# Patient Record
Sex: FEMALE | Race: WHITE | Employment: OTHER | URBAN - METROPOLITAN AREA
[De-identification: names, ages, dates, MRNs, and addresses within clinical notes are randomized per-mention and may not be internally consistent; named-entity substitution may affect disease eponyms.]

---

## 2018-03-22 ENCOUNTER — HOSPITAL ENCOUNTER (OUTPATIENT)
Dept: PREADMISSION TESTING | Age: 68
Discharge: HOME | End: 2018-03-22
Payer: MEDICARE

## 2018-03-22 LAB
HCT VFR BLD AUTO: 38.6 % (ref 35–45)
HGB BLD-MCNC: 12.7 G/DL (ref 12–16)
POTASSIUM SERPL-SCNC: 4.7 MMOL/L (ref 3.5–5.5)

## 2018-03-22 PROCEDURE — 85018 HEMOGLOBIN: CPT | Performed by: PODIATRIST

## 2018-03-22 PROCEDURE — 36415 COLL VENOUS BLD VENIPUNCTURE: CPT | Performed by: PODIATRIST

## 2018-03-22 PROCEDURE — 84132 ASSAY OF SERUM POTASSIUM: CPT | Performed by: PODIATRIST

## 2018-03-23 LAB
FAX TO INFO,FAXT: NORMAL
FAX TO NUMBER,FAXN: NORMAL

## 2018-04-04 ENCOUNTER — HOSPITAL ENCOUNTER (OUTPATIENT)
Dept: LAB | Age: 68
Discharge: HOME | End: 2018-04-04
Payer: MEDICARE

## 2018-04-04 PROCEDURE — 88304 TISSUE EXAM BY PATHOLOGIST: CPT | Performed by: PODIATRIST

## 2018-04-04 PROCEDURE — 88311 DECALCIFY TISSUE: CPT | Performed by: PODIATRIST

## 2018-04-04 PROCEDURE — 88305 TISSUE EXAM BY PATHOLOGIST: CPT | Performed by: PODIATRIST

## 2018-11-29 ENCOUNTER — HOSPITAL ENCOUNTER (OUTPATIENT)
Dept: PREADMISSION TESTING | Age: 68
Discharge: HOME | End: 2018-11-29
Payer: MEDICARE

## 2018-11-29 DIAGNOSIS — M21.6X2 ACQUIRED CAVOVARUS FOOT DEFORMITY, LEFT: ICD-10-CM

## 2018-11-29 DIAGNOSIS — M65.872 OTHER SYNOVITIS AND TENOSYNOVITIS, LEFT ANKLE AND FOOT: ICD-10-CM

## 2018-11-29 DIAGNOSIS — M20.42 HAMMER TOE OF LEFT FOOT: ICD-10-CM

## 2018-11-29 DIAGNOSIS — L60.0 INGROWING NAIL: ICD-10-CM

## 2018-11-29 DIAGNOSIS — Z01.818 PREOP EXAMINATION: ICD-10-CM

## 2018-11-29 LAB
ATRIAL RATE: 77 BPM
CALCULATED P AXIS, ECG09: 57 DEGREES
CALCULATED R AXIS, ECG10: 42 DEGREES
CALCULATED T AXIS, ECG11: 69 DEGREES
DIAGNOSIS, 93000: NORMAL
P-R INTERVAL, ECG05: 166 MS
Q-T INTERVAL, ECG07: 452 MS
QRS DURATION, ECG06: 140 MS
QTC CALCULATION (BEZET), ECG08: 511 MS
VENTRICULAR RATE, ECG03: 77 BPM

## 2018-11-29 PROCEDURE — 93005 ELECTROCARDIOGRAM TRACING: CPT

## 2019-01-08 ENCOUNTER — HOSPITAL ENCOUNTER (OUTPATIENT)
Dept: LAB | Age: 69
Discharge: HOME | End: 2019-01-08
Attending: INTERNAL MEDICINE
Payer: MEDICARE

## 2019-01-08 ENCOUNTER — HOSPITAL ENCOUNTER (OUTPATIENT)
Dept: PREADMISSION TESTING | Age: 69
Discharge: HOME | End: 2019-01-08
Attending: PODIATRIST
Payer: MEDICARE

## 2019-01-08 DIAGNOSIS — M20.42 HAMMER TOE OF LEFT FOOT: ICD-10-CM

## 2019-01-08 DIAGNOSIS — M21.6X2 ACQUIRED CAVOVARUS FOOT DEFORMITY, LEFT: ICD-10-CM

## 2019-01-08 DIAGNOSIS — E78.5 HYPERLIPEMIA: ICD-10-CM

## 2019-01-08 DIAGNOSIS — Z01.812 BLOOD TESTS PRIOR TO TREATMENT OR PROCEDURE: ICD-10-CM

## 2019-01-08 DIAGNOSIS — M65.872 OTHER SYNOVITIS AND TENOSYNOVITIS, LEFT ANKLE AND FOOT: ICD-10-CM

## 2019-01-08 DIAGNOSIS — E55.9 VITAMIN D DEFICIENCY: ICD-10-CM

## 2019-01-08 DIAGNOSIS — L60.0 INGROWING NAIL: ICD-10-CM

## 2019-01-08 LAB
25(OH)D3 SERPL-MCNC: 18.4 NG/ML (ref 30–100)
ALBUMIN SERPL-MCNC: 3.8 G/DL (ref 3.4–5)
ALBUMIN/GLOB SERPL: 1.2 {RATIO} (ref 0.8–1.7)
ALP SERPL-CCNC: 77 U/L (ref 45–117)
ALT SERPL-CCNC: 18 U/L (ref 13–56)
ANION GAP SERPL CALC-SCNC: 8 MMOL/L (ref 3–18)
AST SERPL-CCNC: 17 U/L (ref 15–37)
BILIRUB SERPL-MCNC: 0.4 MG/DL (ref 0.2–1)
BUN SERPL-MCNC: 13 MG/DL (ref 7–18)
BUN/CREAT SERPL: 17
CALCIUM SERPL-MCNC: 8.3 MG/DL (ref 8.5–10.1)
CHLORIDE SERPL-SCNC: 102 MMOL/L (ref 100–108)
CHOLEST SERPL-MCNC: 226 MG/DL
CO2 SERPL-SCNC: 28 MMOL/L (ref 21–32)
CREAT SERPL-MCNC: 0.78 MG/DL (ref 0.6–1.3)
EST. AVERAGE GLUCOSE BLD GHB EST-MCNC: 114 MG/DL
FAX TO INFO,FAXT: NORMAL
FAX TO NUMBER,FAXN: NORMAL
FERRITIN SERPL-MCNC: 59 NG/ML (ref 8–388)
GLOBULIN SER CALC-MCNC: 3.1 G/DL (ref 2–4)
GLUCOSE SERPL-MCNC: 94 MG/DL (ref 74–99)
HBA1C MFR BLD: 5.6 % (ref 4.2–5.6)
HCT VFR BLD AUTO: 39.1 % (ref 35–45)
HDLC SERPL-MCNC: 105 MG/DL (ref 40–60)
HDLC SERPL: 2.2 {RATIO} (ref 0–5)
LDLC SERPL CALC-MCNC: 109.4 MG/DL (ref 0–100)
LIPID PROFILE,FLP: ABNORMAL
POTASSIUM SERPL-SCNC: 3.9 MMOL/L (ref 3.5–5.5)
POTASSIUM SERPL-SCNC: 3.9 MMOL/L (ref 3.5–5.5)
PROT SERPL-MCNC: 6.9 G/DL (ref 6.4–8.2)
SODIUM SERPL-SCNC: 138 MMOL/L (ref 136–145)
TRIGL SERPL-MCNC: 58 MG/DL (ref ?–150)
VLDLC SERPL CALC-MCNC: 11.6 MG/DL

## 2019-01-08 PROCEDURE — 85014 HEMATOCRIT: CPT

## 2019-01-08 PROCEDURE — 83036 HEMOGLOBIN GLYCOSYLATED A1C: CPT

## 2019-01-08 PROCEDURE — 82728 ASSAY OF FERRITIN: CPT

## 2019-01-08 PROCEDURE — 80061 LIPID PANEL: CPT

## 2019-01-08 PROCEDURE — 82306 VITAMIN D 25 HYDROXY: CPT

## 2019-01-08 PROCEDURE — 36415 COLL VENOUS BLD VENIPUNCTURE: CPT

## 2019-01-08 PROCEDURE — 80053 COMPREHEN METABOLIC PANEL: CPT

## 2019-01-09 LAB
FAX TO INFO,FAXT: NORMAL
FAX TO NUMBER,FAXN: NORMAL

## 2019-01-16 ENCOUNTER — HOSPITAL ENCOUNTER (OUTPATIENT)
Dept: LAB | Age: 69
Discharge: HOME | End: 2019-01-16
Payer: MEDICARE

## 2019-01-16 PROCEDURE — 88305 TISSUE EXAM BY PATHOLOGIST: CPT

## 2019-01-16 PROCEDURE — 88311 DECALCIFY TISSUE: CPT

## 2019-01-16 PROCEDURE — 88304 TISSUE EXAM BY PATHOLOGIST: CPT

## 2019-09-09 ENCOUNTER — HOSPITAL ENCOUNTER (EMERGENCY)
Age: 69
Discharge: HOME | End: 2019-09-09
Attending: EMERGENCY MEDICINE
Payer: MEDICARE

## 2019-09-09 VITALS
SYSTOLIC BLOOD PRESSURE: 167 MMHG | OXYGEN SATURATION: 99 % | BODY MASS INDEX: 26.68 KG/M2 | HEIGHT: 62 IN | DIASTOLIC BLOOD PRESSURE: 58 MMHG | WEIGHT: 145 LBS | TEMPERATURE: 98.9 F | RESPIRATION RATE: 18 BRPM | HEART RATE: 85 BPM
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
## 2019-09-09 DIAGNOSIS — L03.113 CELLULITIS OF RIGHT UPPER EXTREMITY: Primary | ICD-10-CM

## 2019-09-09 DIAGNOSIS — S40.861D: ICD-10-CM

## 2019-09-09 PROCEDURE — A9270 NON-COVERED ITEM OR SERVICE: HCPCS | Performed by: PHYSICIAN ASSISTANT

## 2019-09-09 PROCEDURE — 74011636637 HC RX REV CODE- 636/637: Performed by: PHYSICIAN ASSISTANT

## 2019-09-09 PROCEDURE — 99283 EMERGENCY DEPT VISIT LOW MDM: CPT

## 2019-09-09 RX ORDER — CYCLOBENZAPRINE HCL 10 MG
TABLET ORAL
COMMUNITY

## 2019-09-09 RX ORDER — PREDNISONE 10 MG/1
TABLET ORAL
Qty: 21 TAB | Refills: 0 | Status: SHIPPED | OUTPATIENT
Start: 2019-09-09

## 2019-09-09 RX ORDER — PREDNISONE 20 MG/1
60 TABLET ORAL
Status: COMPLETED | OUTPATIENT
Start: 2019-09-09 | End: 2019-09-09

## 2019-09-09 RX ADMIN — PREDNISONE 60 MG: 20 TABLET ORAL at 15:03

## 2019-09-09 NOTE — ED PROVIDER NOTES
EMERGENCY DEPARTMENT HISTORY AND PHYSICAL EXAM    Date: 9/9/2019  Patient Name: Stefani Morocho    History of Presenting Illness     Chief Complaint   Patient presents with    Skin Infection         History Provided By: Patient    2:35 PM  Stefani Morocho is a 78 y.o. female who presents to the emergency department C/O right arm redness and swelling. Patient states yesterday while working in the garden she believes her right hand was bitten by an ant. Approximately 2 hours later she felt the right worsening to her right upper posterior arm. Swelling redness and itching to these 2 areas continue to increase until earlier today she decided to be seen at [facility]. They diagnosed her with cellulitis and insect bite gave her 1 g of Rocephin IM and prescription for Bactrim which she took first dose of this morning. Over the past 2 hours the redness has spread beyond the marker that they placed so she came here. Pt denies new injury, trauma, new bites, fever, axillary pain, lip or tongue swelling, shortness of breath and any other sxs or complaints. PCP: Jennie Hernandez MD    Current Outpatient Medications   Medication Sig Dispense Refill    cyclobenzaprine (FLEXERIL) 10 mg tablet Take  by mouth three (3) times daily as needed for Muscle Spasm(s).  predniSONE (STERAPRED DS) 10 mg dose pack Use per pack instructions. 21 Tab 0    clonazePAM (KLONOPIN) 0.5 mg tablet Take 0.5 mg by mouth nightly as needed.  fluticasone (FLONASE) 50 mcg/actuation nasal spray 1 [place] by Both Nostrils route daily.  ondansetron (ZOFRAN ODT) 8 mg disintegrating tablet Take 8 mg by mouth every eight (8) hours as needed for Nausea.  hyoscyamine SL (LEVSIN/SL) 0.125 mg SL tablet 0.125 mg by SubLINGual route every four (4) hours as needed for Cramping. 1-2 tablets      CALCIUM CARBONATE (CALCIUM 500 PO) Take 1 Tab by mouth two (2) times a day.       cetirizine-psuedoePHEDrine (ZYRTEC-D) 5-120 mg per tablet Take 1 Tab by mouth daily as needed for Allergies.  MULTIVITAMINS W-MINERALS/LUT (CENTRUM SILVER PO) Take 1 Tab by mouth daily. Past History     Past Medical History:  Past Medical History:   Diagnosis Date    Arthritis     Hypertension     Left bundle branch block     Unspecified sleep apnea     STATES BORDERLINE/ NOT TREATED       Past Surgical History:  Past Surgical History:   Procedure Laterality Date    HX CATARACT REMOVAL Bilateral 2015    HX HEENT      partial thyroidectomy    HX HEENT Left     leye tear duct surgery    HX HEENT Left     implant ear    HX ORTHOPAEDIC Right 11    ankle surgery    HX ORTHOPAEDIC Right     foot surgery x2    HX TUBAL LIGATION         Family History:  Family History   Problem Relation Age of Onset    Heart Failure Mother     Stroke Father     Anesth Problems Neg Hx        Social History:  Social History     Tobacco Use    Smoking status: Former Smoker     Packs/day: 1.00     Years: 15.00     Pack years: 15.00     Last attempt to quit: 2003     Years since quittin.6    Smokeless tobacco: Never Used   Substance Use Topics    Alcohol use: Yes     Alcohol/week: 14.0 - 15.0 standard drinks     Types: 2 - 3 Glasses of wine, 12 Cans of beer per week    Drug use: No       Allergies: Allergies   Allergen Reactions    Other Plant, Animal, Environmental Other (comments)     SEASONAL-NASAL CONGESTION/ WATERY EYES         Review of Systems   Review of Systems   Constitutional: Negative for fever. HENT: Negative for facial swelling. Respiratory: Negative for shortness of breath. Skin: Positive for color change and rash. All other systems reviewed and are negative. Physical Exam     Vitals:    19 1305   BP: 167/58   Pulse: 85   Resp: 18   Temp: 98.9 °F (37.2 °C)   SpO2: 99%   Weight: 65.8 kg (145 lb)   Height: 5' 2\" (1.575 m)     Physical Exam   Constitutional: She is oriented to person, place, and time.  She appears well-developed and well-nourished. No distress. HENT:   Head: Normocephalic and atraumatic. Musculoskeletal: Normal range of motion. Neurological: She is alert and oriented to person, place, and time. Skin: Skin is warm and dry. She is not diaphoretic. Diagnostic Study Results     Labs -   No results found for this or any previous visit (from the past 12 hour(s)). Radiologic Studies - none  No orders to display     CT Results  (Last 48 hours)    None        CXR Results  (Last 48 hours)    None          Medications given in the ED-  Medications   predniSONE (DELTASONE) tablet 60 mg (60 mg Oral Given 9/9/19 7011)         Medical Decision Making   I am the first provider for this patient. I reviewed the vital signs, available nursing notes, past medical history, past surgical history, family history and social history. Vital Signs-Reviewed the patient's vital signs. Records Reviewed: Nursing Notes      Procedures:  Procedures    ED Course:  2:35 PM   Initial assessment performed. The patients presenting problems have been discussed, and they are in agreement with the care plan formulated and outlined with them. I have encouraged them to ask questions as they arise throughout their visit. Provider Notes (Medical Decision Making): Teagan Preston is a 78 y.o. female presented over 24 hours after unknown insect bite to right arm. History and exam is most consistent with local allergic dermatitis but due to progression of erythema cellulitis also possible. Patient was given Rocephin and Bactrim this morning a few hours prior to arrival but due to increasing redness came here. Patient has not failed outpatient box at this time. Will start on prednisone as well as continuing Benadryl and Bactrim as prescribed. Advised to return to ED if any streaking fever or worsening symptoms, but appropriate to continue outpatient management at this time.     Diagnosis and Disposition DISCHARGE NOTE:    Rinku Franco's  results have been reviewed with her. She has been counseled regarding her diagnosis, treatment, and plan. She verbally conveys understanding and agreement of the signs, symptoms, diagnosis, treatment and prognosis and additionally agrees to follow up as discussed. She also agrees with the care-plan and conveys that all of her questions have been answered. I have also provided discharge instructions for her that include: educational information regarding their diagnosis and treatment, and list of reasons why they would want to return to the ED prior to their follow-up appointment, should her condition change. She has been provided with education for proper emergency department utilization. CLINICAL IMPRESSION:    1. Cellulitis of right upper extremity    2. Insect bite of right upper extremity, subsequent encounter        PLAN:  1. D/C Home  2. Discharge Medication List as of 9/9/2019  2:47 PM      START taking these medications    Details   predniSONE (STERAPRED DS) 10 mg dose pack Use per pack instructions. , Normal, Disp-21 Tab, R-0         CONTINUE these medications which have NOT CHANGED    Details   cyclobenzaprine (FLEXERIL) 10 mg tablet Take  by mouth three (3) times daily as needed for Muscle Spasm(s). , Historical Med      clonazePAM (KLONOPIN) 0.5 mg tablet Take 0.5 mg by mouth nightly as needed., Historical Med      fluticasone (FLONASE) 50 mcg/actuation nasal spray 1 [place] by Both Nostrils route daily. , Historical Med      ondansetron (ZOFRAN ODT) 8 mg disintegrating tablet Take 8 mg by mouth every eight (8) hours as needed for Nausea., Historical Med      hyoscyamine SL (LEVSIN/SL) 0.125 mg SL tablet 0.125 mg by SubLINGual route every four (4) hours as needed for Cramping.  1-2 tablets, Historical Med      CALCIUM CARBONATE (CALCIUM 500 PO) Take 1 Tab by mouth two (2) times a day., Historical Med      cetirizine-psuedoePHEDrine (ZYRTEC-D) 5-120 mg per tablet Take 1 Tab by mouth daily as needed for Allergies. , Historical Med      MULTIVITAMINS W-MINERALS/LUT (CENTRUM SILVER PO) Take 1 Tab by mouth daily. , Historical Med           3. Follow-up Information     Follow up With Specialties Details Why Contact Info    Jami See MD Internal Medicine In 1 day  [address]      [facility] EMERGENCY DEPT Emergency Medicine  As needed, If symptoms worsen, fever or spreading redness [address]  [address] [address]  [phone]        _______________________________      Please note that this dictation was completed with [employer], the computer voice recognition software. Quite often unanticipated grammatical, syntax, homophones, and other interpretive errors are inadvertently transcribed by the computer software. Please disregard these errors. Please excuse any errors that have escaped final proofreading.

## 2019-09-09 NOTE — ED TRIAGE NOTES
Patient reports bit from ant and possible wasp yesterday while doing yard work. Notable swelling, redness    Patient reports given abx at [facility], taken abx prescribed.  Reports taken OTC Benadryl at home

## 2020-08-27 NOTE — ED NOTES
I have reviewed discharge instructions with the patient. The patient verbalized understanding. Discharge medications reviewed with patient and appropriate educational materials and side effects teaching were provided.  Patient armband removed and shredded
Principal Discharge DX:	[facility]

## 2023-02-28 ENCOUNTER — HOSPITAL ENCOUNTER (OUTPATIENT)
Facility: HOSPITAL | Age: 73
Discharge: HOME | End: 2023-03-03
Payer: MEDICARE

## 2023-02-28 DIAGNOSIS — M23.232 DERANG OF MEDIAL MENISCUS DUE TO OLD TEAR/INJ, LEFT KNEE: ICD-10-CM

## 2023-02-28 LAB
ALBUMIN SERPL-MCNC: 3.9 G/DL (ref 3.4–5)
ALBUMIN/GLOB SERPL: 1.1 (ref 0.8–1.7)
ALP SERPL-CCNC: 78 U/L (ref 45–117)
ALT SERPL-CCNC: 19 U/L (ref 13–56)
ANION GAP SERPL CALC-SCNC: 5 MMOL/L (ref 3–18)
APTT PPP: 31.3 SEC (ref 23–36.4)
AST SERPL-CCNC: 18 U/L (ref 10–38)
BILIRUB SERPL-MCNC: 0.4 MG/DL (ref 0.2–1)
BUN SERPL-MCNC: 9 MG/DL (ref 7–18)
BUN/CREAT SERPL: 14 (ref 12–20)
CALCIUM SERPL-MCNC: 9.3 MG/DL (ref 8.5–10.1)
CHLORIDE SERPL-SCNC: 104 MMOL/L (ref 100–111)
CO2 SERPL-SCNC: 30 MMOL/L (ref 21–32)
CREAT SERPL-MCNC: 0.66 MG/DL (ref 0.6–1.3)
EKG ATRIAL RATE: 65 BPM
EKG DIAGNOSIS: NORMAL
EKG P AXIS: 62 DEGREES
EKG P-R INTERVAL: 170 MS
EKG Q-T INTERVAL: 448 MS
EKG QRS DURATION: 138 MS
EKG QTC CALCULATION (BAZETT): 465 MS
EKG R AXIS: 69 DEGREES
EKG T AXIS: 35 DEGREES
EKG VENTRICULAR RATE: 65 BPM
ERYTHROCYTE [DISTWIDTH] IN BLOOD BY AUTOMATED COUNT: 13.5 % (ref 11.6–14.5)
GLOBULIN SER CALC-MCNC: 3.6 G/DL (ref 2–4)
GLUCOSE SERPL-MCNC: 93 MG/DL (ref 74–99)
HCT VFR BLD AUTO: 41 % (ref 35–45)
HGB BLD-MCNC: 13.3 G/DL (ref 12–16)
INR PPP: 0.9 (ref 0.8–1.2)
MCH RBC QN AUTO: 28.9 PG (ref 24–34)
MCHC RBC AUTO-ENTMCNC: 32.4 G/DL (ref 31–37)
MCV RBC AUTO: 88.9 FL (ref 78–100)
NRBC # BLD: 0 K/UL (ref 0–0.01)
NRBC BLD-RTO: 0 PER 100 WBC
PLATELET # BLD AUTO: 279 K/UL (ref 135–420)
PMV BLD AUTO: 9.5 FL (ref 9.2–11.8)
POTASSIUM SERPL-SCNC: 4 MMOL/L (ref 3.5–5.5)
PROT SERPL-MCNC: 7.5 G/DL (ref 6.4–8.2)
PROTHROMBIN TIME: 12.2 SEC (ref 11.5–15.2)
RBC # BLD AUTO: 4.61 M/UL (ref 4.2–5.3)
SODIUM SERPL-SCNC: 139 MMOL/L (ref 136–145)
WBC # BLD AUTO: 5.9 K/UL (ref 4.6–13.2)

## 2023-02-28 PROCEDURE — 93005 ELECTROCARDIOGRAM TRACING: CPT

## 2023-02-28 PROCEDURE — 85027 COMPLETE CBC AUTOMATED: CPT

## 2023-02-28 PROCEDURE — 85610 PROTHROMBIN TIME: CPT

## 2023-02-28 PROCEDURE — 85730 THROMBOPLASTIN TIME PARTIAL: CPT

## 2023-02-28 PROCEDURE — 36415 COLL VENOUS BLD VENIPUNCTURE: CPT

## 2023-02-28 PROCEDURE — 80053 COMPREHEN METABOLIC PANEL: CPT

## 2023-03-23 PROBLEM — S83.249A MEDIAL MENISCUS TEAR: Status: ACTIVE | Noted: 2023-03-23

## 2023-03-23 PROBLEM — M22.42 CHONDROMALACIA, PATELLA, LEFT: Status: ACTIVE | Noted: 2023-03-23

## 2023-03-23 NOTE — DISCHARGE INSTRUCTIONS
educational materials and side effects teaching were provided.   ___________________________________________________________________________________________________________________________________

## 2023-03-23 NOTE — H&P
Patient Name:   Kathy Garnica    YOB: 1950    Chief Complaint:  MRI followup. History of Chief Complaint:  Viona Hodgkin presents today to review the MRI of her left knee. The MRI does confirm a medial meniscus tear over the central third margin with medial subluxation and some  patellar chondrosis. We have discussed an arthroscopy of the left knee with partial medial meniscectomy and chondroplasty of the patella and further intervention as found to be appropriate. She has been given the risks and benefits of the procedure. At this point, she has failed conservative care and continues to have significant knee pain especially over the medial joint line and wishes to proceed. Past Medical/Surgical History:    Disease/Disorder Date Side Surgery Date Side Comment   Allergies, seasonal         Heart murmur         Intestinal problems         Left bundle branch block         Sleep apnea            Adenoidectomy         Bilateral tubal ligation 12/1985        Cataract extraction         Tear duct repair 09/29/2008 left       Thyroidectomy         Thyroidectomy, partial 02/05/2009        Triple arthrodesis 05/18/2011 right       Tympanoplasty w/ossicular reconstruction, fascia graft, facial nerve electrodes 08/25/2015 left       Weil osteotomy 01/16/2019 left [facility] 08/28/2019 - with #2 toe fusion and flexor tendon transfer     Allergies:  No known allergies. Ingredient Reaction Medication Name Comment   NO KNOWN ALLERGIES        Current Medications:    Medication Directions   clonazepam 0.5 mg tablet take 1 tablet by mouth once daily   multivitamin tablet    nitrofurantoin macrocrystal 100 mg capsule give 1 capsule by mouth at bedtime   sertraline 25 mg tablet    Zyrtec-D 5 mg-120 mg tablet,extended release      Social History:    SMOKING  Status Tobacco Type Units Per Day Yrs Used   Former smoker        ALCOHOL  There is a history of alcohol use. Type: Beer and wine. consumed weekly.     Family

## 2023-03-27 ENCOUNTER — ANESTHESIA EVENT (OUTPATIENT)
Facility: HOSPITAL | Age: 73
End: 2023-03-27
Payer: MEDICARE

## 2023-03-27 ASSESSMENT — LIFESTYLE VARIABLES: SMOKING_STATUS: 0

## 2023-03-27 NOTE — ANESTHESIA PRE PROCEDURE
HIV, HEPCAB    COVID-19 Screening (If Applicable): No results found for: COVID19        Anesthesia Evaluation  Patient summary reviewed and Nursing notes reviewed no history of anesthetic complications:   Airway: Mallampati: II  TM distance: >3 FB   Neck ROM: full  Mouth opening: > = 3 FB   Dental: normal exam         Pulmonary: breath sounds clear to auscultation  (+) sleep apnea (borderline, no treatment):      (-) asthma and not a current smoker (quit 2003)                           Cardiovascular:  Exercise tolerance: good (>4 METS),   (+) hypertension:, valvular problems/murmurs (insiginificant murmur per cardiology):, dysrhythmias (known LBBB):,         Rhythm: regular  Rate: normal                    Neuro/Psych:      (-) seizures and CVA           GI/Hepatic/Renal:        (-) GERD and no morbid obesity       Endo/Other:        (-) diabetes mellitus               Abdominal:             Vascular: Other Findings:           Anesthesia Plan      general     ASA 2     (Plan general anesthesia. Patient has no absolute contraindications to use of an LMA. Risks discussed and patient agrees with plan. GETA back-up if LMA placement unsuccessful. Anesthesia consent form reviewed. Patient given opportunity for questions and all questions answered. Anesthesia consent form signed by patient.)  Induction: intravenous. Anesthetic plan and risks discussed with patient. Plan discussed with CRNA.                     Levi Wetzel MD   3/27/2023

## 2023-03-28 ENCOUNTER — HOSPITAL ENCOUNTER (OUTPATIENT)
Facility: HOSPITAL | Age: 73
Setting detail: OUTPATIENT SURGERY
Discharge: HOME | End: 2023-03-28
Attending: ORTHOPAEDIC SURGERY | Admitting: ORTHOPAEDIC SURGERY
Payer: MEDICARE

## 2023-03-28 ENCOUNTER — ANESTHESIA (OUTPATIENT)
Facility: HOSPITAL | Age: 73
End: 2023-03-28
Payer: MEDICARE

## 2023-03-28 VITALS
WEIGHT: 151 LBS | HEART RATE: 65 BPM | SYSTOLIC BLOOD PRESSURE: 145 MMHG | BODY MASS INDEX: 28.51 KG/M2 | DIASTOLIC BLOOD PRESSURE: 59 MMHG | RESPIRATION RATE: 14 BRPM | HEIGHT: 61 IN | OXYGEN SATURATION: 95 % | TEMPERATURE: 97.2 F

## 2023-03-28 DIAGNOSIS — S83.232D COMPLEX TEAR OF MEDIAL MENISCUS OF LEFT KNEE AS CURRENT INJURY, SUBSEQUENT ENCOUNTER: Primary | ICD-10-CM

## 2023-03-28 PROCEDURE — 2580000003 HC RX 258: Performed by: ORTHOPAEDIC SURGERY

## 2023-03-28 PROCEDURE — 2720000010 HC SURG SUPPLY STERILE: Performed by: ORTHOPAEDIC SURGERY

## 2023-03-28 PROCEDURE — 6360000002 HC RX W HCPCS: Performed by: ANESTHESIOLOGY

## 2023-03-28 PROCEDURE — 6360000002 HC RX W HCPCS: Performed by: ORTHOPAEDIC SURGERY

## 2023-03-28 PROCEDURE — 2500000003 HC RX 250 WO HCPCS

## 2023-03-28 PROCEDURE — 7100000001 HC PACU RECOVERY - ADDTL 15 MIN: Performed by: ORTHOPAEDIC SURGERY

## 2023-03-28 PROCEDURE — 3600000012 HC SURGERY LEVEL 2 ADDTL 15MIN: Performed by: ORTHOPAEDIC SURGERY

## 2023-03-28 PROCEDURE — 3600000002 HC SURGERY LEVEL 2 BASE: Performed by: ORTHOPAEDIC SURGERY

## 2023-03-28 PROCEDURE — 3700000001 HC ADD 15 MINUTES (ANESTHESIA): Performed by: ORTHOPAEDIC SURGERY

## 2023-03-28 PROCEDURE — 7100000000 HC PACU RECOVERY - FIRST 15 MIN: Performed by: ORTHOPAEDIC SURGERY

## 2023-03-28 PROCEDURE — 7100000010 HC PHASE II RECOVERY - FIRST 15 MIN: Performed by: ORTHOPAEDIC SURGERY

## 2023-03-28 PROCEDURE — 6370000000 HC RX 637 (ALT 250 FOR IP): Performed by: ANESTHESIOLOGY

## 2023-03-28 PROCEDURE — 2500000003 HC RX 250 WO HCPCS: Performed by: ORTHOPAEDIC SURGERY

## 2023-03-28 PROCEDURE — 2709999900 HC NON-CHARGEABLE SUPPLY: Performed by: ORTHOPAEDIC SURGERY

## 2023-03-28 PROCEDURE — 3700000000 HC ANESTHESIA ATTENDED CARE: Performed by: ORTHOPAEDIC SURGERY

## 2023-03-28 PROCEDURE — 6360000002 HC RX W HCPCS

## 2023-03-28 PROCEDURE — 7100000011 HC PHASE II RECOVERY - ADDTL 15 MIN: Performed by: ORTHOPAEDIC SURGERY

## 2023-03-28 RX ORDER — ONDANSETRON 2 MG/ML
4 INJECTION INTRAMUSCULAR; INTRAVENOUS
Status: DISCONTINUED | OUTPATIENT
Start: 2023-03-28 | End: 2023-03-28 | Stop reason: HOSPADM

## 2023-03-28 RX ORDER — CEPHALEXIN 500 MG/1
500 CAPSULE ORAL 2 TIMES DAILY
Qty: 14 CAPSULE | Refills: 0 | Status: SHIPPED | OUTPATIENT
Start: 2023-03-28 | End: 2023-04-04

## 2023-03-28 RX ORDER — DEXMEDETOMIDINE HYDROCHLORIDE 100 UG/ML
INJECTION, SOLUTION INTRAVENOUS PRN
Status: DISCONTINUED | OUTPATIENT
Start: 2023-03-28 | End: 2023-03-28 | Stop reason: SDUPTHER

## 2023-03-28 RX ORDER — DROPERIDOL 2.5 MG/ML
0.62 INJECTION, SOLUTION INTRAMUSCULAR; INTRAVENOUS
Status: DISCONTINUED | OUTPATIENT
Start: 2023-03-28 | End: 2023-03-28 | Stop reason: HOSPADM

## 2023-03-28 RX ORDER — ONDANSETRON 2 MG/ML
INJECTION INTRAMUSCULAR; INTRAVENOUS PRN
Status: DISCONTINUED | OUTPATIENT
Start: 2023-03-28 | End: 2023-03-28 | Stop reason: SDUPTHER

## 2023-03-28 RX ORDER — IPRATROPIUM BROMIDE AND ALBUTEROL SULFATE 2.5; .5 MG/3ML; MG/3ML
1 SOLUTION RESPIRATORY (INHALATION)
Status: DISCONTINUED | OUTPATIENT
Start: 2023-03-28 | End: 2023-03-28 | Stop reason: HOSPADM

## 2023-03-28 RX ORDER — ACETAMINOPHEN 500 MG
1000 TABLET ORAL ONCE
Status: COMPLETED | OUTPATIENT
Start: 2023-03-28 | End: 2023-03-28

## 2023-03-28 RX ORDER — LIDOCAINE HYDROCHLORIDE 20 MG/ML
INJECTION, SOLUTION EPIDURAL; INFILTRATION; INTRACAUDAL; PERINEURAL PRN
Status: DISCONTINUED | OUTPATIENT
Start: 2023-03-28 | End: 2023-03-28 | Stop reason: SDUPTHER

## 2023-03-28 RX ORDER — LABETALOL HYDROCHLORIDE 5 MG/ML
10 INJECTION, SOLUTION INTRAVENOUS
Status: DISCONTINUED | OUTPATIENT
Start: 2023-03-28 | End: 2023-03-28 | Stop reason: HOSPADM

## 2023-03-28 RX ORDER — DIPHENHYDRAMINE HYDROCHLORIDE 50 MG/ML
12.5 INJECTION INTRAMUSCULAR; INTRAVENOUS
Status: DISCONTINUED | OUTPATIENT
Start: 2023-03-28 | End: 2023-03-28 | Stop reason: HOSPADM

## 2023-03-28 RX ORDER — SODIUM CHLORIDE 9 MG/ML
INJECTION, SOLUTION INTRAVENOUS PRN
Status: DISCONTINUED | OUTPATIENT
Start: 2023-03-28 | End: 2023-03-28 | Stop reason: HOSPADM

## 2023-03-28 RX ORDER — OXYCODONE HYDROCHLORIDE 5 MG/1
5 TABLET ORAL
Status: COMPLETED | OUTPATIENT
Start: 2023-03-28 | End: 2023-03-28

## 2023-03-28 RX ORDER — FENTANYL CITRATE 50 UG/ML
INJECTION, SOLUTION INTRAMUSCULAR; INTRAVENOUS PRN
Status: DISCONTINUED | OUTPATIENT
Start: 2023-03-28 | End: 2023-03-28 | Stop reason: SDUPTHER

## 2023-03-28 RX ORDER — SODIUM CHLORIDE, SODIUM LACTATE, POTASSIUM CHLORIDE, CALCIUM CHLORIDE 600; 310; 30; 20 MG/100ML; MG/100ML; MG/100ML; MG/100ML
INJECTION, SOLUTION INTRAVENOUS CONTINUOUS
Status: DISCONTINUED | OUTPATIENT
Start: 2023-03-28 | End: 2023-03-28 | Stop reason: HOSPADM

## 2023-03-28 RX ORDER — MIDAZOLAM HYDROCHLORIDE 1 MG/ML
INJECTION INTRAMUSCULAR; INTRAVENOUS PRN
Status: DISCONTINUED | OUTPATIENT
Start: 2023-03-28 | End: 2023-03-28 | Stop reason: SDUPTHER

## 2023-03-28 RX ORDER — HYDROCODONE BITARTRATE AND ACETAMINOPHEN 5; 325 MG/1; MG/1
1 TABLET ORAL EVERY 6 HOURS PRN
Qty: 20 TABLET | Refills: 0 | Status: SHIPPED | OUTPATIENT
Start: 2023-03-28 | End: 2023-04-02

## 2023-03-28 RX ORDER — DEXAMETHASONE SODIUM PHOSPHATE 4 MG/ML
INJECTION, SOLUTION INTRA-ARTICULAR; INTRALESIONAL; INTRAMUSCULAR; INTRAVENOUS; SOFT TISSUE PRN
Status: DISCONTINUED | OUTPATIENT
Start: 2023-03-28 | End: 2023-03-28 | Stop reason: SDUPTHER

## 2023-03-28 RX ORDER — HYDROMORPHONE HYDROCHLORIDE 1 MG/ML
0.5 INJECTION, SOLUTION INTRAMUSCULAR; INTRAVENOUS; SUBCUTANEOUS EVERY 5 MIN PRN
Status: COMPLETED | OUTPATIENT
Start: 2023-03-28 | End: 2023-03-28

## 2023-03-28 RX ORDER — MEPERIDINE HYDROCHLORIDE 50 MG/ML
12.5 INJECTION INTRAMUSCULAR; INTRAVENOUS; SUBCUTANEOUS ONCE
Status: DISCONTINUED | OUTPATIENT
Start: 2023-03-28 | End: 2023-03-28 | Stop reason: HOSPADM

## 2023-03-28 RX ORDER — FENTANYL CITRATE 50 UG/ML
25 INJECTION, SOLUTION INTRAMUSCULAR; INTRAVENOUS EVERY 5 MIN PRN
Status: DISCONTINUED | OUTPATIENT
Start: 2023-03-28 | End: 2023-03-28 | Stop reason: HOSPADM

## 2023-03-28 RX ORDER — SODIUM CHLORIDE 0.9 % (FLUSH) 0.9 %
5-40 SYRINGE (ML) INJECTION PRN
Status: DISCONTINUED | OUTPATIENT
Start: 2023-03-28 | End: 2023-03-28 | Stop reason: HOSPADM

## 2023-03-28 RX ORDER — PROPOFOL 10 MG/ML
INJECTION, EMULSION INTRAVENOUS PRN
Status: DISCONTINUED | OUTPATIENT
Start: 2023-03-28 | End: 2023-03-28 | Stop reason: SDUPTHER

## 2023-03-28 RX ORDER — SODIUM CHLORIDE 0.9 % (FLUSH) 0.9 %
5-40 SYRINGE (ML) INJECTION EVERY 12 HOURS SCHEDULED
Status: DISCONTINUED | OUTPATIENT
Start: 2023-03-28 | End: 2023-03-28 | Stop reason: HOSPADM

## 2023-03-28 RX ADMIN — PROPOFOL 120 MG: 10 INJECTION, EMULSION INTRAVENOUS at 07:34

## 2023-03-28 RX ADMIN — SODIUM CHLORIDE, POTASSIUM CHLORIDE, SODIUM LACTATE AND CALCIUM CHLORIDE: 600; 310; 30; 20 INJECTION, SOLUTION INTRAVENOUS at 06:33

## 2023-03-28 RX ADMIN — LIDOCAINE HYDROCHLORIDE 60 MG: 20 INJECTION, SOLUTION EPIDURAL; INFILTRATION; INTRACAUDAL; PERINEURAL at 07:34

## 2023-03-28 RX ADMIN — ACETAMINOPHEN 1000 MG: 500 TABLET ORAL at 06:35

## 2023-03-28 RX ADMIN — ONDANSETRON HYDROCHLORIDE 4 MG: 2 INJECTION INTRAMUSCULAR; INTRAVENOUS at 07:42

## 2023-03-28 RX ADMIN — DEXMEDETOMIDINE HYDROCHLORIDE 10 MCG: 100 INJECTION, SOLUTION INTRAVENOUS at 08:04

## 2023-03-28 RX ADMIN — FENTANYL CITRATE 25 MCG: 50 INJECTION, SOLUTION INTRAMUSCULAR; INTRAVENOUS at 07:52

## 2023-03-28 RX ADMIN — FENTANYL CITRATE 50 MCG: 50 INJECTION, SOLUTION INTRAMUSCULAR; INTRAVENOUS at 07:29

## 2023-03-28 RX ADMIN — MIDAZOLAM 2 MG: 1 INJECTION INTRAMUSCULAR; INTRAVENOUS at 07:29

## 2023-03-28 RX ADMIN — Medication 2 G: at 07:39

## 2023-03-28 RX ADMIN — HYDROMORPHONE HYDROCHLORIDE 0.5 MG: 1 INJECTION, SOLUTION INTRAMUSCULAR; INTRAVENOUS; SUBCUTANEOUS at 08:32

## 2023-03-28 RX ADMIN — HYDROMORPHONE HYDROCHLORIDE 0.5 MG: 1 INJECTION, SOLUTION INTRAMUSCULAR; INTRAVENOUS; SUBCUTANEOUS at 08:39

## 2023-03-28 RX ADMIN — FENTANYL CITRATE 25 MCG: 50 INJECTION, SOLUTION INTRAMUSCULAR; INTRAVENOUS at 07:42

## 2023-03-28 RX ADMIN — OXYCODONE HYDROCHLORIDE 5 MG: 5 TABLET ORAL at 09:42

## 2023-03-28 RX ADMIN — DEXAMETHASONE SODIUM PHOSPHATE 8 MG: 4 INJECTION, SOLUTION INTRAMUSCULAR; INTRAVENOUS at 07:42

## 2023-03-28 ASSESSMENT — PAIN DESCRIPTION - ORIENTATION
ORIENTATION: LEFT

## 2023-03-28 ASSESSMENT — PAIN DESCRIPTION - LOCATION
LOCATION: KNEE

## 2023-03-28 ASSESSMENT — PAIN DESCRIPTION - PAIN TYPE
TYPE: SURGICAL PAIN

## 2023-03-28 ASSESSMENT — PAIN SCALES - GENERAL
PAINLEVEL_OUTOF10: 0
PAINLEVEL_OUTOF10: 8
PAINLEVEL_OUTOF10: 6
PAINLEVEL_OUTOF10: 0
PAINLEVEL_OUTOF10: 6
PAINLEVEL_OUTOF10: 5
PAINLEVEL_OUTOF10: 0
PAINLEVEL_OUTOF10: 5
PAINLEVEL_OUTOF10: 0
PAINLEVEL_OUTOF10: 8
PAINLEVEL_OUTOF10: 0

## 2023-03-28 ASSESSMENT — PAIN DESCRIPTION - DESCRIPTORS
DESCRIPTORS: ACHING;HEAVINESS
DESCRIPTORS: ACHING
DESCRIPTORS: ACHING;HEAVINESS
DESCRIPTORS: ACHING
DESCRIPTORS: DULL

## 2023-03-28 NOTE — OP NOTE
evaluated and probed. The medial compartment demonstrated a complex tear . The lateral compartment demonstrated no significant abnormalities. Arthroscopic meniscectomy was performed with a shaver where appropriated until no loose or unstable cartilage remained upon probing. A chondroplasty was performed over the articular surface of the patella. Additional procedures included plica resection. The knee was irrigated with the remainder of the arthroscopic lavage and injected with 30 ml of .25% Marcaine with Epinephrine and 4 mg of Morphine sulfate. The incisions were reapproximated with 2-0 nylon suture in horizontal mattress fashion x 2. A soft sterile dressing and an ace wrap were placed. The patient recovered from anesthesia and was transferred to the post-anesthesia care unit in stable condition.

## 2023-03-28 NOTE — INTERVAL H&P NOTE
Update History & Physical    The patient's History and Physical of March 28, chart was reviewed with the patient and I examined the patient. There was no change. The surgical site was confirmed by the patient and me. Plan: The risks, benefits, expected outcome, and alternative to the recommended procedure have been discussed with the patient. Patient understands and wants to proceed with the procedure.      Electronically signed by Yony Mccallum MD on 3/28/2023 at 6:53 AM

## 2023-03-28 NOTE — PERIOP NOTE
TRANSFER - IN REPORT:    Verbal report received from Stephanie Chavez RN on [facility]  being received from PACU for routine post-op      Report consisted of patient's Situation, Background, Assessment and   Recommendations(SBAR). Information from the following report(s) Nurse Handoff Report, Adult Overview, Surgery Report, Intake/Output, and MAR was reviewed with the receiving nurse. Opportunity for questions and clarification was provided. Assessment completed upon patient's arrival to unit and care assumed.

## 2023-03-28 NOTE — PERIOP NOTE
Discussed pain management with patient. Patient states level of of pain is improved from 8 to 7. Asleep unless stimulated and relaxed in body appearance. Single digit RR. Advised not safe to give additional IV pain meds. She verbalized understanding and agreement.

## 2023-03-28 NOTE — PERIOP NOTE
Discharge instructions reviewed with patient and caregiver, both verbalize understanding.  Opportunities for questions given

## 2023-03-28 NOTE — PERIOP NOTE
Arrived to Phase 2 - alert but drowsy - assisted to recliner - pt states \" I didn't think I would have any pain \" - states pain \" 6/10\" - FLACC - 0.   Encouraged coughing and deep breathing - PO2 from 97% down to 92 % - pt sleepy

## 2023-03-28 NOTE — PERIOP NOTE
Reviewed PTA medication list with patient/caregiver and patient/caregiver denies any additional medications. Patient admits to having a responsible adult care for them at home for at least 24 hours after surgery. Patient encouraged to use gown warming system and informed that using said warming gown to regulate body temperature prior to a procedure has been shown to help reduce the risks of blood clots and infection. Patient's pharmacy of choice verified and documented in PTA medication section. Dual skin assessment & fall risk band verification completed with ALONDRA PLACSENCIA.    Nozin pop-swabs and CHG wipes done upon arrival.

## 2023-03-28 NOTE — ANESTHESIA POSTPROCEDURE EVALUATION
Department of Anesthesiology  Postprocedure Note    Patient: Cornelia Ormond  MRN: [id number]  YOB: 1950  Date of evaluation: 3/28/2023      Procedure Summary     Date: 03/28/23 Room / Location: THE [facility] 08 / [facility] OR    Anesthesia Start: 0728 Anesthesia Stop: 2191    Procedure: LEFT KNEE ARTHROSCOPY, PARTIAL MEDIAL MENISCECTOMY AND CHONDROPLASTY PATELLA, AND PLICA RESECTION (Left: Knee) Diagnosis:       Derang of medial meniscus due to old tear/inj, left knee      (LEFT KNEE MEDIAL MENISCAL TEAR)    Surgeons: Bennett Ramirez MD Responsible Provider: Pretty Ramon MD    Anesthesia Type: General ASA Status: 2          Anesthesia Type: General    Mohit Phase I: Mohit Score: 9    Mohit Phase II: Mohit Score: 10      Anesthesia Post Evaluation    Comments: Post-Anesthesia Evaluation and Assessment    Cardiovascular Function/Vital Signs/Pain Score  Vitals  BP: (!) 145/59  Temp: 97.2 °F (36.2 °C)  Temp Source: Temporal  Heart Rate: 65  Resp: 14  SpO2: 95 %  Height: 5' 1\" (154.9 cm)  Weight: 151 lb (68.5 kg)  Pain Level: 0     Patient is status post Procedure(s):  LEFT KNEE ARTHROSCOPY, PARTIAL MEDIAL MENISCECTOMY AND CHONDROPLASTY PATELLA, AND PLICA RESECTION. Nausea/Vomiting: Controlled. Postoperative hydration reviewed and adequate. Pain:  Managed. Neurological Status: At baseline. Mental Status and Level of Consciousness: Arousable. Pulmonary Status:   Adequate oxygenation and airway patent. Complications related to anesthesia: None    Post-anesthesia assessment completed. No concerns. Patient has met all discharge requirements.     Signed By: Pretty Ramon MD    March 28, 2023
no

## 2023-03-28 NOTE — PERIOP NOTE
Encouraged po fluids and crackers.  at bedside.   Encouraged pt to have BP evaluation as per anesthesia, verbalizes understanding

## 2023-05-06 ENCOUNTER — APPOINTMENT (OUTPATIENT)
Facility: HOSPITAL | Age: 73
End: 2023-05-06
Payer: MEDICARE

## 2023-05-06 ENCOUNTER — HOSPITAL ENCOUNTER (EMERGENCY)
Facility: HOSPITAL | Age: 73
Discharge: HOME | End: 2023-05-06
Attending: EMERGENCY MEDICINE
Payer: MEDICARE

## 2023-05-06 VITALS
HEART RATE: 64 BPM | HEIGHT: 61 IN | TEMPERATURE: 97.6 F | OXYGEN SATURATION: 96 % | BODY MASS INDEX: 27.38 KG/M2 | SYSTOLIC BLOOD PRESSURE: 168 MMHG | WEIGHT: 145 LBS | DIASTOLIC BLOOD PRESSURE: 53 MMHG | RESPIRATION RATE: 14 BRPM

## 2023-05-06 DIAGNOSIS — M54.41 ACUTE BILATERAL LOW BACK PAIN WITH BILATERAL SCIATICA: Primary | ICD-10-CM

## 2023-05-06 DIAGNOSIS — M54.42 ACUTE BILATERAL LOW BACK PAIN WITH BILATERAL SCIATICA: Primary | ICD-10-CM

## 2023-05-06 LAB
ALBUMIN SERPL-MCNC: 3.9 G/DL (ref 3.4–5)
ALBUMIN/GLOB SERPL: 1.1 (ref 0.8–1.7)
ALP SERPL-CCNC: 73 U/L (ref 45–117)
ALT SERPL-CCNC: 24 U/L (ref 13–56)
ANION GAP SERPL CALC-SCNC: 2 MMOL/L (ref 3–18)
APPEARANCE UR: CLEAR
AST SERPL-CCNC: 45 U/L (ref 10–38)
BASOPHILS # BLD: 0 K/UL (ref 0–0.1)
BASOPHILS NFR BLD: 0 % (ref 0–2)
BILIRUB SERPL-MCNC: 0.3 MG/DL (ref 0.2–1)
BILIRUB UR QL: NEGATIVE
BUN SERPL-MCNC: 11 MG/DL (ref 7–18)
BUN/CREAT SERPL: 17 (ref 12–20)
CALCIUM SERPL-MCNC: 9.1 MG/DL (ref 8.5–10.1)
CHLORIDE SERPL-SCNC: 106 MMOL/L (ref 100–111)
CO2 SERPL-SCNC: 28 MMOL/L (ref 21–32)
COLOR UR: YELLOW
CREAT SERPL-MCNC: 0.63 MG/DL (ref 0.6–1.3)
DIFFERENTIAL METHOD BLD: NORMAL
EKG ATRIAL RATE: 69 BPM
EKG DIAGNOSIS: NORMAL
EKG P AXIS: 36 DEGREES
EKG P-R INTERVAL: 152 MS
EKG Q-T INTERVAL: 468 MS
EKG QRS DURATION: 156 MS
EKG QTC CALCULATION (BAZETT): 501 MS
EKG R AXIS: -33 DEGREES
EKG T AXIS: 93 DEGREES
EKG VENTRICULAR RATE: 69 BPM
EOSINOPHIL # BLD: 0.1 K/UL (ref 0–0.4)
EOSINOPHIL NFR BLD: 1 % (ref 0–5)
ERYTHROCYTE [DISTWIDTH] IN BLOOD BY AUTOMATED COUNT: 13.3 % (ref 11.6–14.5)
GLOBULIN SER CALC-MCNC: 3.7 G/DL (ref 2–4)
GLUCOSE SERPL-MCNC: 110 MG/DL (ref 74–99)
GLUCOSE UR STRIP.AUTO-MCNC: NEGATIVE MG/DL
HCT VFR BLD AUTO: 39.3 % (ref 35–45)
HGB BLD-MCNC: 13 G/DL (ref 12–16)
HGB UR QL STRIP: NEGATIVE
IMM GRANULOCYTES # BLD AUTO: 0 K/UL (ref 0–0.04)
IMM GRANULOCYTES NFR BLD AUTO: 0 % (ref 0–0.5)
KETONES UR QL STRIP.AUTO: NEGATIVE MG/DL
LEUKOCYTE ESTERASE UR QL STRIP.AUTO: NEGATIVE
LYMPHOCYTES # BLD: 1.9 K/UL (ref 0.9–3.6)
LYMPHOCYTES NFR BLD: 26 % (ref 21–52)
MCH RBC QN AUTO: 29.2 PG (ref 24–34)
MCHC RBC AUTO-ENTMCNC: 33.1 G/DL (ref 31–37)
MCV RBC AUTO: 88.3 FL (ref 78–100)
MONOCYTES # BLD: 0.6 K/UL (ref 0.05–1.2)
MONOCYTES NFR BLD: 8 % (ref 3–10)
NEUTS SEG # BLD: 4.6 K/UL (ref 1.8–8)
NEUTS SEG NFR BLD: 64 % (ref 40–73)
NITRITE UR QL STRIP.AUTO: NEGATIVE
NRBC # BLD: 0 K/UL (ref 0–0.01)
NRBC BLD-RTO: 0 PER 100 WBC
PH UR STRIP: 6.5 (ref 5–8)
PLATELET # BLD AUTO: 285 K/UL (ref 135–420)
PMV BLD AUTO: 10.1 FL (ref 9.2–11.8)
POTASSIUM SERPL-SCNC: 5.2 MMOL/L (ref 3.5–5.5)
PROT SERPL-MCNC: 7.6 G/DL (ref 6.4–8.2)
PROT UR STRIP-MCNC: NEGATIVE MG/DL
RBC # BLD AUTO: 4.45 M/UL (ref 4.2–5.3)
SODIUM SERPL-SCNC: 136 MMOL/L (ref 136–145)
SP GR UR REFRACTOMETRY: <1.005 (ref 1–1.03)
TROPONIN I SERPL HS-MCNC: 16 NG/L (ref 0–54)
UROBILINOGEN UR QL STRIP.AUTO: 0.2 EU/DL (ref 0.2–1)
WBC # BLD AUTO: 7.2 K/UL (ref 4.6–13.2)

## 2023-05-06 PROCEDURE — 6360000002 HC RX W HCPCS: Performed by: EMERGENCY MEDICINE

## 2023-05-06 PROCEDURE — 6360000004 HC RX CONTRAST MEDICATION: Performed by: EMERGENCY MEDICINE

## 2023-05-06 PROCEDURE — 81003 URINALYSIS AUTO W/O SCOPE: CPT

## 2023-05-06 PROCEDURE — 96374 THER/PROPH/DIAG INJ IV PUSH: CPT

## 2023-05-06 PROCEDURE — 84484 ASSAY OF TROPONIN QUANT: CPT

## 2023-05-06 PROCEDURE — 72132 CT LUMBAR SPINE W/DYE: CPT

## 2023-05-06 PROCEDURE — 85025 COMPLETE CBC W/AUTO DIFF WBC: CPT

## 2023-05-06 PROCEDURE — 2580000003 HC RX 258: Performed by: EMERGENCY MEDICINE

## 2023-05-06 PROCEDURE — 96375 TX/PRO/DX INJ NEW DRUG ADDON: CPT

## 2023-05-06 PROCEDURE — 6370000000 HC RX 637 (ALT 250 FOR IP): Performed by: EMERGENCY MEDICINE

## 2023-05-06 PROCEDURE — 93005 ELECTROCARDIOGRAM TRACING: CPT | Performed by: EMERGENCY MEDICINE

## 2023-05-06 PROCEDURE — 80053 COMPREHEN METABOLIC PANEL: CPT

## 2023-05-06 PROCEDURE — 99285 EMERGENCY DEPT VISIT HI MDM: CPT

## 2023-05-06 RX ORDER — MORPHINE SULFATE 4 MG/ML
4 INJECTION, SOLUTION INTRAMUSCULAR; INTRAVENOUS
Status: COMPLETED | OUTPATIENT
Start: 2023-05-06 | End: 2023-05-06

## 2023-05-06 RX ORDER — PREDNISONE 20 MG/1
20 TABLET ORAL 2 TIMES DAILY
Qty: 10 TABLET | Refills: 0 | Status: SHIPPED | OUTPATIENT
Start: 2023-05-06 | End: 2023-05-11

## 2023-05-06 RX ORDER — ONDANSETRON 2 MG/ML
4 INJECTION INTRAMUSCULAR; INTRAVENOUS
Status: COMPLETED | OUTPATIENT
Start: 2023-05-06 | End: 2023-05-06

## 2023-05-06 RX ORDER — CYCLOBENZAPRINE HCL 10 MG
10 TABLET ORAL
Status: COMPLETED | OUTPATIENT
Start: 2023-05-06 | End: 2023-05-06

## 2023-05-06 RX ORDER — CYCLOBENZAPRINE HCL 10 MG
10 TABLET ORAL 3 TIMES DAILY PRN
Qty: 30 TABLET | Refills: 0 | Status: SHIPPED | OUTPATIENT
Start: 2023-05-06

## 2023-05-06 RX ORDER — OXYCODONE HYDROCHLORIDE AND ACETAMINOPHEN 5; 325 MG/1; MG/1
1 TABLET ORAL EVERY 6 HOURS PRN
Qty: 20 TABLET | Refills: 0 | Status: SHIPPED | OUTPATIENT
Start: 2023-05-06 | End: 2023-05-09

## 2023-05-06 RX ADMIN — CYCLOBENZAPRINE 10 MG: 10 TABLET, FILM COATED ORAL at 03:29

## 2023-05-06 RX ADMIN — WATER 40 MG: 1 INJECTION INTRAMUSCULAR; INTRAVENOUS; SUBCUTANEOUS at 03:29

## 2023-05-06 RX ADMIN — IOPAMIDOL 100 ML: 612 INJECTION, SOLUTION INTRAVENOUS at 04:58

## 2023-05-06 RX ADMIN — MORPHINE SULFATE 4 MG: 4 INJECTION INTRAVENOUS at 03:31

## 2023-05-06 RX ADMIN — ONDANSETRON 4 MG: 2 INJECTION INTRAMUSCULAR; INTRAVENOUS at 03:29

## 2023-05-06 ASSESSMENT — PAIN SCALES - GENERAL: PAINLEVEL_OUTOF10: 10

## 2023-05-06 NOTE — ED NOTES
Report given to THE [facility], RN  05/06/23 9779

## 2023-05-06 NOTE — ED PROVIDER NOTES
Alk Phosphatase 73 45 - 117 U/L    Total Protein 7.6 6.4 - 8.2 g/dL    Albumin 3.9 3.4 - 5.0 g/dL    Globulin 3.7 2.0 - 4.0 g/dL    Albumin/Globulin Ratio 1.1 0.8 - 1.7     Troponin    Collection Time: 05/06/23  3:32 AM   Result Value Ref Range    Troponin, High Sensitivity 16 0 - 54 ng/L   Urinalysis    Collection Time: 05/06/23  3:59 AM   Result Value Ref Range    Color, UA YELLOW      Appearance CLEAR      Specific Gravity, UA <1.005 (L) 1.005 - 1.030    pH, Urine 6.5 5.0 - 8.0      Protein, UA Negative NEG mg/dL    Glucose, UA Negative NEG mg/dL    Ketones, Urine Negative NEG mg/dL    Bilirubin Urine Negative NEG      Blood, Urine Negative NEG      Urobilinogen, Urine 0.2 0.2 - 1.0 EU/dL    Nitrite, Urine Negative NEG      Leukocyte Esterase, Urine Negative NEG          Radiologic Studies - ***  CT LUMBAR SPINE W CONTRAST    (Results Pending)     @[facility]@  [facility]@    Medications given in the ED-  Medications   iopamidol (ISOVUE-300) 61 % injection 100 mL (has no administration in time range)   methylPREDNISolone sodium succ (SOLU-MEDROL) 40 mg in sterile water 1 mL injection (40 mg IntraVENous Given 5/6/23 0329)   cyclobenzaprine (FLEXERIL) tablet 10 mg (10 mg Oral Given 5/6/23 0329)   morphine sulfate (PF) injection 4 mg (4 mg IntraVENous Given 5/6/23 0331)   ondansetron (ZOFRAN) injection 4 mg (4 mg IntraVENous Given 5/6/23 0329)         Medical Decision Making   I am the first provider for this patient. I reviewed the vital signs, available nursing notes, past medical history, past surgical history, family history and social history. Vital Signs-Reviewed the patient's vital signs.     Pulse Oximetry Analysis - ***% on ***     Cardiac Monitor:  Rate: *** bpm  Rhythm: ***    EKG interpretation: (Preliminary)  4:19 AM   ***  EKG read by Masha Islas MD      Records Reviewed: NURSING NOTES AND PREVIOUS MEDICAL RECORDS    Provider Notes (Medical Decision Making):   ***    Procedures:  Procedures    ED

## 2023-05-06 NOTE — ED TRIAGE NOTES
Pt arrived bilat sciatic pain. Pt reports the pain starts in her buttock and shoots down both her legs all the way to her heels. Pt denies trauma to the area. No falls noted. Pt reports this started Friday morning. Pt ambulatory with steady gait.

## 2023-05-08 LAB
EKG ATRIAL RATE: 69 BPM
EKG DIAGNOSIS: NORMAL
EKG P AXIS: 36 DEGREES
EKG P-R INTERVAL: 152 MS
EKG Q-T INTERVAL: 468 MS
EKG QRS DURATION: 156 MS
EKG QTC CALCULATION (BAZETT): 501 MS
EKG R AXIS: -33 DEGREES
EKG T AXIS: 93 DEGREES
EKG VENTRICULAR RATE: 69 BPM

## 2023-05-11 ENCOUNTER — HOSPITAL ENCOUNTER (EMERGENCY)
Facility: HOSPITAL | Age: 73
Discharge: HOME | End: 2023-05-11
Attending: EMERGENCY MEDICINE
Payer: MEDICARE

## 2023-05-11 ENCOUNTER — APPOINTMENT (OUTPATIENT)
Facility: HOSPITAL | Age: 73
End: 2023-05-11
Payer: MEDICARE

## 2023-05-11 VITALS
DIASTOLIC BLOOD PRESSURE: 58 MMHG | OXYGEN SATURATION: 100 % | RESPIRATION RATE: 19 BRPM | SYSTOLIC BLOOD PRESSURE: 160 MMHG | TEMPERATURE: 97.5 F | HEART RATE: 72 BPM | BODY MASS INDEX: 27.38 KG/M2 | WEIGHT: 145 LBS | HEIGHT: 61 IN

## 2023-05-11 DIAGNOSIS — I10 UNCONTROLLED HYPERTENSION: Primary | ICD-10-CM

## 2023-05-11 DIAGNOSIS — R42 DIZZINESS: ICD-10-CM

## 2023-05-11 LAB
ALBUMIN SERPL-MCNC: 3.7 G/DL (ref 3.4–5)
ALBUMIN/GLOB SERPL: 1.1 (ref 0.8–1.7)
ALP SERPL-CCNC: 71 U/L (ref 45–117)
ALT SERPL-CCNC: 27 U/L (ref 13–56)
ANION GAP SERPL CALC-SCNC: 5 MMOL/L (ref 3–18)
AST SERPL-CCNC: 16 U/L (ref 10–38)
BASOPHILS # BLD: 0 K/UL (ref 0–0.1)
BASOPHILS NFR BLD: 0 % (ref 0–2)
BILIRUB SERPL-MCNC: 0.2 MG/DL (ref 0.2–1)
BUN SERPL-MCNC: 13 MG/DL (ref 7–18)
BUN/CREAT SERPL: 16 (ref 12–20)
CALCIUM SERPL-MCNC: 8.8 MG/DL (ref 8.5–10.1)
CHLORIDE SERPL-SCNC: 104 MMOL/L (ref 100–111)
CO2 SERPL-SCNC: 31 MMOL/L (ref 21–32)
CREAT SERPL-MCNC: 0.82 MG/DL (ref 0.6–1.3)
DIFFERENTIAL METHOD BLD: ABNORMAL
EOSINOPHIL # BLD: 0.1 K/UL (ref 0–0.4)
EOSINOPHIL NFR BLD: 1 % (ref 0–5)
ERYTHROCYTE [DISTWIDTH] IN BLOOD BY AUTOMATED COUNT: 13.4 % (ref 11.6–14.5)
GLOBULIN SER CALC-MCNC: 3.3 G/DL (ref 2–4)
GLUCOSE SERPL-MCNC: 80 MG/DL (ref 74–99)
HCT VFR BLD AUTO: 39.5 % (ref 35–45)
HGB BLD-MCNC: 13.2 G/DL (ref 12–16)
IMM GRANULOCYTES # BLD AUTO: 0 K/UL (ref 0–0.04)
IMM GRANULOCYTES NFR BLD AUTO: 0 % (ref 0–0.5)
LYMPHOCYTES # BLD: 4.1 K/UL (ref 0.9–3.6)
LYMPHOCYTES NFR BLD: 36 % (ref 21–52)
MAGNESIUM SERPL-MCNC: 2.1 MG/DL (ref 1.6–2.6)
MCH RBC QN AUTO: 29.2 PG (ref 24–34)
MCHC RBC AUTO-ENTMCNC: 33.4 G/DL (ref 31–37)
MCV RBC AUTO: 87.4 FL (ref 78–100)
MONOCYTES # BLD: 1 K/UL (ref 0.05–1.2)
MONOCYTES NFR BLD: 8 % (ref 3–10)
NEUTS SEG # BLD: 6.4 K/UL (ref 1.8–8)
NEUTS SEG NFR BLD: 55 % (ref 40–73)
NRBC # BLD: 0 K/UL (ref 0–0.01)
NRBC BLD-RTO: 0 PER 100 WBC
PLATELET # BLD AUTO: 326 K/UL (ref 135–420)
PMV BLD AUTO: 9.8 FL (ref 9.2–11.8)
POTASSIUM SERPL-SCNC: 3.4 MMOL/L (ref 3.5–5.5)
PROT SERPL-MCNC: 7 G/DL (ref 6.4–8.2)
RBC # BLD AUTO: 4.52 M/UL (ref 4.2–5.3)
SODIUM SERPL-SCNC: 140 MMOL/L (ref 136–145)
TROPONIN I SERPL HS-MCNC: 12 NG/L (ref 0–54)
TROPONIN I SERPL HS-MCNC: 13 NG/L (ref 0–54)
WBC # BLD AUTO: 11.6 K/UL (ref 4.6–13.2)

## 2023-05-11 PROCEDURE — 70450 CT HEAD/BRAIN W/O DYE: CPT

## 2023-05-11 PROCEDURE — 83735 ASSAY OF MAGNESIUM: CPT

## 2023-05-11 PROCEDURE — 80053 COMPREHEN METABOLIC PANEL: CPT

## 2023-05-11 PROCEDURE — 71045 X-RAY EXAM CHEST 1 VIEW: CPT

## 2023-05-11 PROCEDURE — 99285 EMERGENCY DEPT VISIT HI MDM: CPT

## 2023-05-11 PROCEDURE — 84484 ASSAY OF TROPONIN QUANT: CPT

## 2023-05-11 PROCEDURE — 93005 ELECTROCARDIOGRAM TRACING: CPT | Performed by: PHYSICIAN ASSISTANT

## 2023-05-11 PROCEDURE — 85025 COMPLETE CBC W/AUTO DIFF WBC: CPT

## 2023-05-11 ASSESSMENT — PAIN - FUNCTIONAL ASSESSMENT: PAIN_FUNCTIONAL_ASSESSMENT: NONE - DENIES PAIN

## 2023-05-11 ASSESSMENT — LIFESTYLE VARIABLES
HOW MANY STANDARD DRINKS CONTAINING ALCOHOL DO YOU HAVE ON A TYPICAL DAY: PATIENT DOES NOT DRINK
HOW OFTEN DO YOU HAVE A DRINK CONTAINING ALCOHOL: NEVER

## 2023-05-11 NOTE — ED PROVIDER NOTES
THE [facility] EMERGENCY DEPT  EMERGENCY DEPARTMENT ENCOUNTER       Pt Name: Amy Altman  MRN: [id number]  [place] 1950  Date of evaluation: 5/11/2023  Provider: Shelly Goss PA-C   PCP: Horacio Gray MD  Note Started: 4:55 PM 5/11/23     CHIEF COMPLAINT       Chief Complaint   Patient presents with    Dizziness    Hypertension        HISTORY OF PRESENT ILLNESS: 1 or more elements      History From: Patient  HPI Limitations: None     Amy Altman is a 67 y.o. female who presents to the emergency department with a chief complaint of elevated blood pressure reading over the last 2 days with associated mild headache and dizziness. Patient was recently started on blood pressure medication by her PCP. She was told to monitor by checking once per evening. She called her PCP office when she had the elevated readings emergency department for evaluation. She states that the headache has improved but she still just does not quite feel like herself. She denies syncope, focal weakness, chest pain, shortness of breath, fever, neck pain, abdominal pain, nausea, vomiting, diarrhea, blurred vision, decreased urine output. Nursing Notes were all reviewed and agreed with or any disagreements were addressed in the HPI.     PAST HISTORY     Past Medical History:  Past Medical History:   Diagnosis Date    Advance directive discussed with patient     pt states she has one and will bring copy DOS    Arthritis     OA- knucles and knees    Exercise tolerance finding     pt states able to climb 2 flights stairs without SOB or CP    [place] (hard of hearing)     wears bilateral hearing aids    Hypertension     pt states resolved- no longer on meds since 2021    Left bundle branch block 2018    have had normal EKG since    Sleep apnea 2018    pt states mild case- does not use a c-pap machine- denies having a pulmonary physician    Unspecified sleep apnea     STATES BORDERLINE/ NOT TREATED    Wears glasses     White coat

## 2023-05-11 NOTE — ED TRIAGE NOTES
Pt arrived with c/o HTN and dizziness. Pt was recently started on BP medications last week. Pt states she called her doctor who told her to be seen in ED. Pt endorses dizziness and lightheadedness. Pt denies changes in balance. Pt feels weak but equally in all extremities. Pt face is symmetrical. Pt in NAD att.

## 2023-05-12 LAB
EKG ATRIAL RATE: 60 BPM
EKG DIAGNOSIS: NORMAL
EKG P AXIS: 37 DEGREES
EKG P-R INTERVAL: 166 MS
EKG Q-T INTERVAL: 440 MS
EKG QRS DURATION: 140 MS
EKG QTC CALCULATION (BAZETT): 440 MS
EKG R AXIS: -17 DEGREES
EKG T AXIS: 91 DEGREES
EKG VENTRICULAR RATE: 60 BPM

## 2023-05-12 NOTE — DISCHARGE INSTRUCTIONS
Take your blood pressure medication at home, check blood pressure before bedtime.  Call PCP tomorrow for BP recheck and for medication management
Swelling of the R calve; Spine appears normal, range of motion is not limited, no muscle or joint tenderness

## (undated) DEVICE — APPLICATOR MEDICATED 26 CC SOLUTION HI LT ORNG CHLORAPREP

## (undated) DEVICE — PACK PROCEDURE SURG KNEE ARTHSCP CUST

## (undated) DEVICE — SUTURE ETHLN SZ 3-0 L18IN NONABSORBABLE BLK PS-2 L19MM 3/8 1669H

## (undated) DEVICE — TUBE IRRIG L8IN LNG PT W/ CONN FOR PMP SYS REDEUCE

## (undated) DEVICE — 3M™ BAIR PAWS FLEX™ WARMING GOWN, SMALL, 20 PER CASE 81103: Brand: BAIR PAWS™

## (undated) DEVICE — TUBING PMP L8FT LNG W/ CONN FOR AR-6400 REDEUCE

## (undated) DEVICE — BANDAGE COMPR W6INXL10YD ST M E WHITE/BEIGE

## (undated) DEVICE — SOLUTION IRRIG 3000ML LAC R FLX CONT

## (undated) DEVICE — SUPER TURBOVAC 90 INTEGRATED CABLE WAND ICW: Brand: COBLATION

## (undated) DEVICE — GLOVE SURG SZ 65 L12IN FNGR THK79MIL GRN LTX FREE

## (undated) DEVICE — GARMENT,MEDLINE,DVT,INT,CALF,MED, GEN2: Brand: MEDLINE

## (undated) DEVICE — 4.5 MM INCISOR PLUS STRAIGHT                                    BLADES, POWER/EP-1, VIOLET, PACKAGED                                    6 PER BOX, STERILE

## (undated) DEVICE — GLOVE SURG SZ 9 THK91MIL LTX FREE SYN POLYISOPRENE ANTI